# Patient Record
Sex: FEMALE | Race: WHITE | NOT HISPANIC OR LATINO | Employment: OTHER | URBAN - METROPOLITAN AREA
[De-identification: names, ages, dates, MRNs, and addresses within clinical notes are randomized per-mention and may not be internally consistent; named-entity substitution may affect disease eponyms.]

---

## 2019-11-07 ENCOUNTER — OFFICE VISIT (OUTPATIENT)
Dept: NEUROSURGERY | Facility: CLINIC | Age: 64
End: 2019-11-07

## 2019-11-07 DIAGNOSIS — M51.26 HERNIATION OF INTERVERTEBRAL DISC OF LUMBAR SPINE: Primary | ICD-10-CM

## 2019-11-07 PROCEDURE — 99203 OFFICE O/P NEW LOW 30 MIN: CPT | Performed by: NEUROLOGICAL SURGERY

## 2019-11-07 NOTE — PROGRESS NOTES
Subjective   Patient ID: Kaye Jaime is a 64 y.o. female is being seen for consultation today at the request of Destiny Steen MD  Chief Complaint: Left leg pain    History of Present Illness: The patient is a 64-year-old woman who is a retired  who currently lives in [facility] with her  who retired from [employer] in 2011.  They are planning to move back to [place].  She has a history of pain in the left hip and leg for the past 10 months that occurs with standing or sitting or riding in a car or bending.  It to help with stretching, sometimes with walking or rest with a ice pack.  She had an EMG study and was noted to have abnormality at L5, she has had dragging of her left foot and walking about the house.    Review of Radiographic Studies:  Lumbar MRI scan shows a mild left L4-5 lateral recess stenosis which appears actually to be quite symptomatic with radiculopathy including motor weakness.    The following portions of the patient's history were reviewed, updated as appropriate and approved: allergies, current medications, past family history, past medical history, past social history, past surgical history, review of systems and problem list.    Review of Systems   Constitutional: Negative for activity change, appetite change, chills, diaphoresis, fatigue, fever and unexpected weight change.   HENT: Negative for congestion, dental problem, drooling, ear discharge, ear pain, facial swelling, hearing loss, mouth sores, nosebleeds, postnasal drip, rhinorrhea, sinus pressure, sneezing, sore throat, tinnitus, trouble swallowing and voice change.    Eyes: Negative for photophobia, pain, discharge, redness, itching and visual disturbance.   Respiratory: Negative for apnea, cough, choking, chest tightness, shortness of breath, wheezing and stridor.    Cardiovascular: Negative for chest pain, palpitations and leg swelling.   Gastrointestinal: Negative for abdominal distention,  abdominal pain, anal bleeding, blood in stool, constipation, diarrhea, nausea, rectal pain and vomiting.   Endocrine: Negative for cold intolerance, heat intolerance, polydipsia, polyphagia and polyuria.   Genitourinary: Negative for decreased urine volume, difficulty urinating, dysuria, enuresis, flank pain, frequency, genital sores, hematuria and urgency.   Musculoskeletal: Positive for arthralgias, back pain, myalgias, neck pain and neck stiffness. Negative for gait problem and joint swelling.   Skin: Negative for color change, pallor, rash and wound.   Allergic/Immunologic: Negative for environmental allergies, food allergies and immunocompromised state.   Neurological: Negative for dizziness, tremors, seizures, syncope, facial asymmetry, speech difficulty, weakness, light-headedness, numbness and headaches.   Hematological: Negative for adenopathy. Does not bruise/bleed easily.   Psychiatric/Behavioral: Positive for dysphoric mood. Negative for agitation, behavioral problems, confusion, decreased concentration, hallucinations, self-injury, sleep disturbance and suicidal ideas. The patient is nervous/anxious. The patient is not hyperactive.        Objective     NEUROLOGICAL EXAMINATION:      MENTAL STATUS:  Alert and oriented.  Speech intact.  Recent and remote memory intact.      CRANIAL NERVES:  Cranial nerve II:  Visual fields are full.  Cranial nerves III, IV and VI:  PERRLADC.  Extraocular movements are intact.  Nystagmus is not present.  Cranial nerve V:  Facial sensation is intact.  Cranial nerve VII:  Muscles of facial expression reveal no asymmetry.  Cranial nerve VIII:  Hearing is intact.  Cranial nerves IX and X:  Palate elevates symmetrically.  Cranial nerve XI:  Shoulder shrug is intact.  Cranial nerve XII:  Tongue is midline without evidence of atrophy or fasciculation.    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       Plan   Physical therapy.  Number myelogram.  Follow-up in the office in 6 weeks.  If pain is no better and the myelogram confirms the L5 radiculopathy, she would be a candidate for minimal access left L4-5 decompressive laminotomy.       Steven Andrade MD

## 2019-11-27 ENCOUNTER — APPOINTMENT (OUTPATIENT)
Dept: CT IMAGING | Facility: HOSPITAL | Age: 64
End: 2019-11-27

## 2019-11-27 ENCOUNTER — HOSPITAL ENCOUNTER (OUTPATIENT)
Facility: HOSPITAL | Age: 64
Setting detail: HOSPITAL OUTPATIENT SURGERY
Discharge: HOME | End: 2019-11-27
Attending: RADIOLOGY | Admitting: NEUROLOGICAL SURGERY

## 2019-11-27 VITALS
TEMPERATURE: 97.6 F | HEIGHT: 66 IN | WEIGHT: 158.95 LBS | DIASTOLIC BLOOD PRESSURE: 81 MMHG | RESPIRATION RATE: 16 BRPM | HEART RATE: 81 BPM | OXYGEN SATURATION: 97 % | BODY MASS INDEX: 25.55 KG/M2 | SYSTOLIC BLOOD PRESSURE: 139 MMHG

## 2019-11-27 DIAGNOSIS — M51.26 HERNIATION OF INTERVERTEBRAL DISC OF LUMBAR SPINE: ICD-10-CM

## 2019-11-27 PROCEDURE — 25010000003 LIDOCAINE 1 % SOLUTION: Performed by: RADIOLOGY

## 2019-11-27 PROCEDURE — 72132 CT LUMBAR SPINE W/DYE: CPT

## 2019-11-27 PROCEDURE — 62304 MYELOGRAPHY LUMBAR INJECTION: CPT | Performed by: RADIOLOGY

## 2019-11-27 PROCEDURE — 0 IOPAMIDOL 41 % SOLUTION: Performed by: RADIOLOGY

## 2019-11-27 RX ORDER — LORAZEPAM 1 MG/1
1 TABLET ORAL EVERY 8 HOURS PRN
COMMUNITY

## 2019-11-27 RX ORDER — FLUOXETINE 10 MG/1
15 CAPSULE ORAL DAILY
COMMUNITY

## 2019-11-27 RX ORDER — AMITRIPTYLINE HYDROCHLORIDE 50 MG/1
50 TABLET, FILM COATED ORAL NIGHTLY
COMMUNITY

## 2019-11-27 RX ORDER — FAMOTIDINE 20 MG/1
20 TABLET, FILM COATED ORAL 2 TIMES DAILY
COMMUNITY

## 2019-11-27 RX ORDER — LEVOTHYROXINE SODIUM 112 UG/1
112 TABLET ORAL DAILY
COMMUNITY

## 2019-11-27 RX ORDER — LIDOCAINE HYDROCHLORIDE 10 MG/ML
INJECTION, SOLUTION INFILTRATION; PERINEURAL AS NEEDED
Status: DISCONTINUED | OUTPATIENT
Start: 2019-11-27 | End: 2019-11-27 | Stop reason: HOSPADM

## 2019-12-02 ENCOUNTER — TELEPHONE (OUTPATIENT)
Dept: NEUROSURGERY | Facility: CLINIC | Age: 64
End: 2019-12-02

## 2019-12-02 NOTE — TELEPHONE ENCOUNTER
Call patient and advised her to increase her fluid intake as well as drink caffeine.  She will lie flat as much as possible.  Symptoms do appear to be consistent with a spinal headache.  Patient will call back in a couple of days if symptoms do not subside.  At that time we can try to get her in for a blood patch.

## 2019-12-02 NOTE — TELEPHONE ENCOUNTER
Provider:  Raymond  Caller: pt  Surgery:  Myelo 11/27/19  Surgery Date:    Last visit:   11/07/19   Next visit:             Reason for call:       ----- Message from Mary Wilcox sent at 12/2/2019  3:44 PM EST -----  Regarding: PT  HEALTH ISSUE  Contact: [phone]  PT called at 3:20 and c/o headache sine having myelo last Wed., also stated that vision has blurred in her left eye. Valarie Murrell phone number [phone]. (Raymond patient)

## 2019-12-05 ENCOUNTER — OFFICE VISIT (OUTPATIENT)
Dept: NEUROSURGERY | Facility: CLINIC | Age: 64
End: 2019-12-05

## 2019-12-05 VITALS — HEIGHT: 66 IN | WEIGHT: 159 LBS | BODY MASS INDEX: 25.55 KG/M2 | TEMPERATURE: 98.1 F

## 2019-12-05 DIAGNOSIS — M48.061 STENOSIS OF LATERAL RECESS OF LUMBAR SPINE: ICD-10-CM

## 2019-12-05 DIAGNOSIS — M51.26 HERNIATION OF INTERVERTEBRAL DISC OF LUMBAR SPINE: Primary | ICD-10-CM

## 2019-12-05 PROCEDURE — 99213 OFFICE O/P EST LOW 20 MIN: CPT | Performed by: NEUROLOGICAL SURGERY

## 2019-12-05 NOTE — PROGRESS NOTES
Subjective   Kaye Jaime is a 64 y.o. female who presents for follow up of left leg pain.     The patient is a 64-year-old retired  from [employer] with a history of pain in the left hip and leg for the last 11 months.  She has even started to have some pain in the right hip, not as much as the left.  EMG study showed an abnormality at L5 on the left.  She has had some dragging of her left foot.  Lumbar MRI scan showed mild left L4-5 lateral recess stenosis.    Lumbar myelogram done on 11/27/2019 shows bilateral lateral recess stenosis at L4-5 on the right and left.    The following portions of the patient's history were reviewed, updated as appropriate and approved: allergies, current medications, past family history, past medical history, past social history, past surgical history, review of systems and problem list.     Review of Systems   Constitutional: Negative.    HENT: Negative.    Eyes: Negative.    Respiratory: Negative.    Cardiovascular: Negative.    Gastrointestinal: Negative.    Endocrine: Negative.    Genitourinary: Negative.    Musculoskeletal: Negative.    Skin: Negative.    Allergic/Immunologic: Negative.    Neurological: Negative.    Hematological: Negative.    Psychiatric/Behavioral: Negative.    All other systems reviewed and are negative.      Objective    NEUROLOGICAL EXAMINATION:    Mild dorsiflexion weakness of the left first toe.    Assessment   Bilateral lateral recess stenosis L4-5, primarily symptomatic on the left but also beginning to be symptomatic on the right.       Plan   Recommend minimal access decompressive laminectomy L4-5 bilaterally from a left-sided approach.  She will consider the recommendation and if she decides to have it done call the office for surgical scheduling after the beginning of the new year.       Steven Andrade MD

## 2019-12-10 ENCOUNTER — PREP FOR SURGERY (OUTPATIENT)
Dept: OTHER | Facility: HOSPITAL | Age: 64
End: 2019-12-10

## 2019-12-10 DIAGNOSIS — M51.26 HERNIATION OF INTERVERTEBRAL DISC OF LUMBAR SPINE: ICD-10-CM

## 2019-12-10 DIAGNOSIS — M48.061 STENOSIS OF LATERAL RECESS OF LUMBAR SPINE: Primary | ICD-10-CM

## 2019-12-10 RX ORDER — ACETAMINOPHEN 325 MG/1
650 TABLET ORAL ONCE
Status: CANCELLED | OUTPATIENT
Start: 2019-12-10 | End: 2019-12-10

## 2019-12-10 RX ORDER — HYDROCODONE BITARTRATE AND ACETAMINOPHEN 7.5; 325 MG/1; MG/1
1 TABLET ORAL ONCE
Status: CANCELLED | OUTPATIENT
Start: 2019-12-10 | End: 2019-12-10

## 2019-12-10 RX ORDER — IBUPROFEN 800 MG/1
800 TABLET ORAL ONCE
Status: CANCELLED | OUTPATIENT
Start: 2019-12-10 | End: 2019-12-10

## 2019-12-10 RX ORDER — CHLORHEXIDINE GLUCONATE 4 G/100ML
SOLUTION TOPICAL
Qty: 120 ML | Refills: 0 | Status: SHIPPED | OUTPATIENT
Start: 2019-12-10 | End: 2020-02-12 | Stop reason: HOSPADM

## 2019-12-10 RX ORDER — SODIUM CHLORIDE AND POTASSIUM CHLORIDE 150; 900 MG/100ML; MG/100ML
50 INJECTION, SOLUTION INTRAVENOUS CONTINUOUS
Status: CANCELLED | OUTPATIENT
Start: 2019-12-10

## 2019-12-10 RX ORDER — CEFAZOLIN SODIUM 2 G/100ML
2 INJECTION, SOLUTION INTRAVENOUS ONCE
Status: CANCELLED | OUTPATIENT
Start: 2019-12-10 | End: 2019-12-10

## 2019-12-10 RX ORDER — SODIUM CHLORIDE 0.9 % (FLUSH) 0.9 %
3 SYRINGE (ML) INJECTION EVERY 12 HOURS SCHEDULED
Status: CANCELLED | OUTPATIENT
Start: 2019-12-10

## 2019-12-19 ENCOUNTER — TELEPHONE (OUTPATIENT)
Dept: NEUROSURGERY | Facility: CLINIC | Age: 64
End: 2019-12-19

## 2019-12-19 NOTE — TELEPHONE ENCOUNTER
Provider:  Raymond  Caller: Patient  Time of call:  11:33am  Phone #:  [phone]  Surgery: Being Scheduled (Laminectomy)  Surgery Date: February    Last visit:  12/05/2019   Next visit: N/A    Reason for call:     Patient would like to know what the recovery time is after surgery, and if therapy will be needed as well.

## 2019-12-19 NOTE — TELEPHONE ENCOUNTER
Typically patients will stay one night in the hospital.  We will keep her on lifting and activity restrictions for several weeks.  After her first postoperative follow-up she may slowly increase her activities assuming she is doing well.  Need for physical therapy will be determined at first postoperative visit.  If patients are still having some pain following surgery it is reasonable to send him to PT but not always necessary

## 2020-02-04 ENCOUNTER — APPOINTMENT (OUTPATIENT)
Dept: PREADMISSION TESTING | Facility: HOSPITAL | Age: 65
End: 2020-02-04

## 2020-02-04 DIAGNOSIS — M48.061 STENOSIS OF LATERAL RECESS OF LUMBAR SPINE: ICD-10-CM

## 2020-02-04 DIAGNOSIS — M51.26 HERNIATION OF INTERVERTEBRAL DISC OF LUMBAR SPINE: ICD-10-CM

## 2020-02-04 LAB
ANION GAP SERPL CALCULATED.3IONS-SCNC: 11 MMOL/L (ref 5–15)
BUN BLD-MCNC: 17 MG/DL (ref 8–23)
BUN/CREAT SERPL: 18.7 (ref 7–25)
CALCIUM SPEC-SCNC: 8.9 MG/DL (ref 8.6–10.5)
CHLORIDE SERPL-SCNC: 95 MMOL/L (ref 98–107)
CO2 SERPL-SCNC: 25 MMOL/L (ref 22–29)
CREAT BLD-MCNC: 0.91 MG/DL (ref 0.57–1)
DEPRECATED RDW RBC AUTO: 42.5 FL (ref 37–54)
ERYTHROCYTE [DISTWIDTH] IN BLOOD BY AUTOMATED COUNT: 12.8 % (ref 12.3–15.4)
GFR SERPL CREATININE-BSD FRML MDRD: 62 ML/MIN/1.73
GLUCOSE BLD-MCNC: 94 MG/DL (ref 65–99)
HCT VFR BLD AUTO: 34.2 % (ref 34–46.6)
HGB BLD-MCNC: 11.2 G/DL (ref 12–15.9)
MCH RBC QN AUTO: 29.6 PG (ref 26.6–33)
MCHC RBC AUTO-ENTMCNC: 32.7 G/DL (ref 31.5–35.7)
MCV RBC AUTO: 90.5 FL (ref 79–97)
MRSA DNA SPEC QL NAA+PROBE: NEGATIVE
PLATELET # BLD AUTO: 283 10*3/MM3 (ref 140–450)
PMV BLD AUTO: 8.6 FL (ref 6–12)
POTASSIUM BLD-SCNC: 4.1 MMOL/L (ref 3.5–5.2)
RBC # BLD AUTO: 3.78 10*6/MM3 (ref 3.77–5.28)
SODIUM BLD-SCNC: 131 MMOL/L (ref 136–145)
WBC NRBC COR # BLD: 5.92 10*3/MM3 (ref 3.4–10.8)

## 2020-02-04 PROCEDURE — 87641 MR-STAPH DNA AMP PROBE: CPT | Performed by: NEUROLOGICAL SURGERY

## 2020-02-04 PROCEDURE — 85027 COMPLETE CBC AUTOMATED: CPT | Performed by: NEUROLOGICAL SURGERY

## 2020-02-04 PROCEDURE — 80048 BASIC METABOLIC PNL TOTAL CA: CPT | Performed by: NEUROLOGICAL SURGERY

## 2020-02-04 PROCEDURE — 36415 COLL VENOUS BLD VENIPUNCTURE: CPT

## 2020-02-04 PROCEDURE — 93010 ELECTROCARDIOGRAM REPORT: CPT | Performed by: INTERNAL MEDICINE

## 2020-02-04 PROCEDURE — 93005 ELECTROCARDIOGRAM TRACING: CPT

## 2020-02-04 RX ORDER — ESTRADIOL 1 MG/1
1 TABLET ORAL DAILY
COMMUNITY

## 2020-02-04 RX ORDER — BUSPIRONE HYDROCHLORIDE 15 MG/1
15 TABLET ORAL 2 TIMES DAILY
COMMUNITY

## 2020-02-04 NOTE — DISCHARGE INSTRUCTIONS
Bactroban and Chlorhexidine Prescription given during PAT visit, as well as written and verbal instructions given to patient during PAT visit.  Patient/family also instructed to complete skin prep checklist and return the checklist on the day of surgery to preoperative staff.  Patient/family verbalized understanding.    Patient instructed to drink 20 ounces (or until full) of Gatorade and it needs to be completed 1 hour before given arrival time for procedure (NO RED Gatorade)    Patient verbalized understanding.    Patient instructed to remove all jewelry for procedure.  Patient was instructed that if unable to remove jewelry especially rings to request assistance from a jeweler. Explained that if the piece of jewelry could not be removed before arrival to preop that it will be cut off.  Reinforced with patient that all jewelry must be removed for safety reasons that taping a ring was not an option.  Patient verbalized understanding.    The following information and instructions were given:    Do not eat, drink, smoke or chew gum after midnight the night before surgery. This also includes no mints.  Take all routine, prescribed medications including heart and blood pressure medicines with a sip of water unless otherwise instructed by your physician.   Do NOT take diabetic medication unless instructed by your physician.    DO NOT shave for two days before your procedure.  Do not wear makeup.      DO NOT wear fingernail polish (gel/regular) and/or acrylic/artificial nails on the day of surgery.   If you have had a recent manicure and would rather not remove polish or artificial nails, then the minimum requirement is that the polish/artificial nails must be removed from the middle finger on each hand.      If you are having surgery/procedure on an upper extremity, then fingernail polish/artificial fingernails must be removed for surgery.  NO EXCEPTIONS.      If you are having surgery/procedure on a lower extremity,  then toenail polish on both extremities must be removed for surgery.  NO EXCEPTIONS.    Remove all jewelry (advise to go to jeweler if unable to remove).  Jewelry, especially rings, can no longer be taped for surgery.    Leave anything you consider valuable at home.    Leave your suitcase in the car until after your surgery.    Bring the following with you the day of your procedure (when applicable)       -picture ID and insurance cards       -Co-pay/deductible required by insurance       -Medications in the original bottles (not a list) including all over-the-counter medications if not brought to PAT       -Copy of advance directive, living will or power of  documents if not brought to PAT       -CPAP or BIPAP mask and tubing (do not bring machine)       -Skin prep instruction(s) sheet       -PAT Pass    Education booklet, brochure, handout or binder related to procedure given to patient.  Education booklet also includes general information related to their recovery that mentions signs and symptoms of infection and when to call the doctor.    When applicable, an ERAS booklet was given to patient.    Pain Control After Surgery handout given to patient.    Respirex use (handout given to patient) and pneumonia prevention education provided.    Signs and Symptoms of infection were discussed with patient in Pre Admission testing.  Patient instructed to call their doctor if any of the following symptoms are noted during recovery:  fever of 100.4 F or higher, incision that is warm or has increasing bleeding, redness, or drainage.    DVT Prevention instructions given verbally during Pre Admission Testing appointment that stressed the importance of ambulation to improve blood circulation.  Also encouraged patient to perform foot exercises when in bed and that the application of a sequential device might be applied to lower extremities to improve circulation.      Please apply Chlorhexadine wipes to surgical area (if  instructed) the night before procedure and the AM of procedure and document date/time of applications on skin prep instruction sheet.

## 2020-02-04 NOTE — PAT
Bactroban and Chlorhexidine Prescription given during PAT visit, as well as written and verbal instructions given to patient during PAT visit.  Patient/family also instructed to complete skin prep checklist and return the checklist on the day of surgery to preoperative staff.  Patient/family verbalized understanding.    Patient instructed to remove all jewelry for procedure.  Patient was instructed that if unable to remove jewelry especially rings to request assistance from a jeweler. Explained that if the piece of jewelry could not be removed before arrival to preop that it will be cut off.  Reinforced with patient that all jewelry must be removed for safety reasons that taping a ring was not an option.  Patient verbalized understanding.    Patient to apply Chlorhexadine wipes  to surgical area (as instructed) the night before procedure and the AM of procedure. Wipes provided.    Patient instructed to drink 20 ounces (or until full) of Gatorade and it needs to be completed 1 hour before given arrival time for procedure (NO RED Gatorade)    Patient verbalized understanding.

## 2020-02-11 ENCOUNTER — ANESTHESIA EVENT (OUTPATIENT)
Dept: PERIOP | Facility: HOSPITAL | Age: 65
End: 2020-02-11

## 2020-02-11 RX ORDER — FAMOTIDINE 10 MG/ML
20 INJECTION, SOLUTION INTRAVENOUS ONCE
Status: CANCELLED | OUTPATIENT
Start: 2020-02-11 | End: 2020-02-11

## 2020-02-12 ENCOUNTER — HOSPITAL ENCOUNTER (OUTPATIENT)
Facility: HOSPITAL | Age: 65
Setting detail: HOSPITAL OUTPATIENT SURGERY
Discharge: HOME | End: 2020-02-12
Attending: NEUROLOGICAL SURGERY | Admitting: NEUROLOGICAL SURGERY

## 2020-02-12 ENCOUNTER — ANESTHESIA (OUTPATIENT)
Dept: PERIOP | Facility: HOSPITAL | Age: 65
End: 2020-02-12

## 2020-02-12 ENCOUNTER — APPOINTMENT (OUTPATIENT)
Dept: GENERAL RADIOLOGY | Facility: HOSPITAL | Age: 65
End: 2020-02-12

## 2020-02-12 VITALS
HEART RATE: 81 BPM | BODY MASS INDEX: 24.75 KG/M2 | DIASTOLIC BLOOD PRESSURE: 91 MMHG | WEIGHT: 154 LBS | RESPIRATION RATE: 20 BRPM | OXYGEN SATURATION: 97 % | TEMPERATURE: 97.3 F | SYSTOLIC BLOOD PRESSURE: 135 MMHG | HEIGHT: 66 IN

## 2020-02-12 DIAGNOSIS — M48.061 STENOSIS OF LATERAL RECESS OF LUMBAR SPINE: Primary | ICD-10-CM

## 2020-02-12 LAB — GLUCOSE BLDC GLUCOMTR-MCNC: 75 MG/DL (ref 70–130)

## 2020-02-12 PROCEDURE — 82962 GLUCOSE BLOOD TEST: CPT

## 2020-02-12 PROCEDURE — 25010000002 METHYLPREDNISOLONE PER 40 MG: Performed by: NEUROLOGICAL SURGERY

## 2020-02-12 PROCEDURE — 25010000002 ONDANSETRON PER 1 MG: Performed by: NURSE ANESTHETIST, CERTIFIED REGISTERED

## 2020-02-12 PROCEDURE — 63047 LAM FACETEC & FORAMOT LUMBAR: CPT | Performed by: NEUROLOGICAL SURGERY

## 2020-02-12 PROCEDURE — 25010000002 DEXAMETHASONE PER 1 MG: Performed by: NURSE ANESTHETIST, CERTIFIED REGISTERED

## 2020-02-12 PROCEDURE — 25010000002 NEOSTIGMINE 10 MG/10ML SOLUTION: Performed by: NURSE ANESTHETIST, CERTIFIED REGISTERED

## 2020-02-12 PROCEDURE — 25010000002 PROPOFOL 10 MG/ML EMULSION: Performed by: NURSE ANESTHETIST, CERTIFIED REGISTERED

## 2020-02-12 PROCEDURE — 76000 FLUOROSCOPY <1 HR PHYS/QHP: CPT

## 2020-02-12 PROCEDURE — 25010000003 LIDOCAINE 1 % SOLUTION: Performed by: NURSE ANESTHETIST, CERTIFIED REGISTERED

## 2020-02-12 PROCEDURE — 25010000002 FENTANYL CITRATE (PF) 100 MCG/2ML SOLUTION: Performed by: NURSE ANESTHETIST, CERTIFIED REGISTERED

## 2020-02-12 PROCEDURE — 25010000002 MIDAZOLAM PER 1 MG: Performed by: ANESTHESIOLOGY

## 2020-02-12 PROCEDURE — S0260 H&P FOR SURGERY: HCPCS | Performed by: NEUROLOGICAL SURGERY

## 2020-02-12 PROCEDURE — 25010000002 PHENYLEPHRINE PER 1 ML: Performed by: NURSE ANESTHETIST, CERTIFIED REGISTERED

## 2020-02-12 RX ORDER — MAGNESIUM HYDROXIDE 1200 MG/15ML
LIQUID ORAL AS NEEDED
Status: DISCONTINUED | OUTPATIENT
Start: 2020-02-12 | End: 2020-02-12 | Stop reason: HOSPADM

## 2020-02-12 RX ORDER — MIDAZOLAM HYDROCHLORIDE 1 MG/ML
2 INJECTION INTRAMUSCULAR; INTRAVENOUS ONCE
Status: COMPLETED | OUTPATIENT
Start: 2020-02-12 | End: 2020-02-12

## 2020-02-12 RX ORDER — ACETAMINOPHEN 325 MG/1
650 TABLET ORAL ONCE
Status: COMPLETED | OUTPATIENT
Start: 2020-02-12 | End: 2020-02-12

## 2020-02-12 RX ORDER — NEOSTIGMINE METHYLSULFATE 1 MG/ML
INJECTION, SOLUTION INTRAVENOUS AS NEEDED
Status: DISCONTINUED | OUTPATIENT
Start: 2020-02-12 | End: 2020-02-12 | Stop reason: SURG

## 2020-02-12 RX ORDER — DEXAMETHASONE SODIUM PHOSPHATE 4 MG/ML
INJECTION, SOLUTION INTRA-ARTICULAR; INTRALESIONAL; INTRAMUSCULAR; INTRAVENOUS; SOFT TISSUE AS NEEDED
Status: DISCONTINUED | OUTPATIENT
Start: 2020-02-12 | End: 2020-02-12 | Stop reason: SURG

## 2020-02-12 RX ORDER — LIDOCAINE HYDROCHLORIDE 10 MG/ML
INJECTION, SOLUTION INFILTRATION; PERINEURAL AS NEEDED
Status: DISCONTINUED | OUTPATIENT
Start: 2020-02-12 | End: 2020-02-12 | Stop reason: SURG

## 2020-02-12 RX ORDER — PROMETHAZINE HYDROCHLORIDE 25 MG/ML
6.25 INJECTION, SOLUTION INTRAMUSCULAR; INTRAVENOUS ONCE AS NEEDED
Status: DISCONTINUED | OUTPATIENT
Start: 2020-02-12 | End: 2020-02-12 | Stop reason: HOSPADM

## 2020-02-12 RX ORDER — SCOLOPAMINE TRANSDERMAL SYSTEM 1 MG/1
1 PATCH, EXTENDED RELEASE TRANSDERMAL ONCE
Status: DISCONTINUED | OUTPATIENT
Start: 2020-02-12 | End: 2020-02-12 | Stop reason: HOSPADM

## 2020-02-12 RX ORDER — HYDROCODONE BITARTRATE AND ACETAMINOPHEN 5; 325 MG/1; MG/1
1 TABLET ORAL EVERY 6 HOURS PRN
Qty: 30 TABLET | Refills: 0 | Status: SHIPPED | OUTPATIENT
Start: 2020-02-12

## 2020-02-12 RX ORDER — METHYLPREDNISOLONE ACETATE 40 MG/ML
INJECTION, SUSPENSION INTRA-ARTICULAR; INTRALESIONAL; INTRAMUSCULAR; SOFT TISSUE AS NEEDED
Status: DISCONTINUED | OUTPATIENT
Start: 2020-02-12 | End: 2020-02-12 | Stop reason: HOSPADM

## 2020-02-12 RX ORDER — HYDROCODONE BITARTRATE AND ACETAMINOPHEN 7.5; 325 MG/1; MG/1
1 TABLET ORAL ONCE
Status: COMPLETED | OUTPATIENT
Start: 2020-02-12 | End: 2020-02-12

## 2020-02-12 RX ORDER — BUPIVACAINE HYDROCHLORIDE AND EPINEPHRINE 2.5; 5 MG/ML; UG/ML
INJECTION, SOLUTION EPIDURAL; INFILTRATION; INTRACAUDAL; PERINEURAL AS NEEDED
Status: DISCONTINUED | OUTPATIENT
Start: 2020-02-12 | End: 2020-02-12 | Stop reason: HOSPADM

## 2020-02-12 RX ORDER — FENTANYL CITRATE 50 UG/ML
INJECTION, SOLUTION INTRAMUSCULAR; INTRAVENOUS AS NEEDED
Status: DISCONTINUED | OUTPATIENT
Start: 2020-02-12 | End: 2020-02-12 | Stop reason: SURG

## 2020-02-12 RX ORDER — GLYCOPYRROLATE 0.2 MG/ML
INJECTION INTRAMUSCULAR; INTRAVENOUS AS NEEDED
Status: DISCONTINUED | OUTPATIENT
Start: 2020-02-12 | End: 2020-02-12 | Stop reason: SURG

## 2020-02-12 RX ORDER — IBUPROFEN 800 MG/1
800 TABLET ORAL ONCE
Status: COMPLETED | OUTPATIENT
Start: 2020-02-12 | End: 2020-02-12

## 2020-02-12 RX ORDER — CEFAZOLIN SODIUM 2 G/100ML
2 INJECTION, SOLUTION INTRAVENOUS ONCE
Status: DISCONTINUED | OUTPATIENT
Start: 2020-02-12 | End: 2020-02-12 | Stop reason: HOSPADM

## 2020-02-12 RX ORDER — ROCURONIUM BROMIDE 10 MG/ML
INJECTION, SOLUTION INTRAVENOUS AS NEEDED
Status: DISCONTINUED | OUTPATIENT
Start: 2020-02-12 | End: 2020-02-12 | Stop reason: SURG

## 2020-02-12 RX ORDER — SODIUM CHLORIDE 0.9 % (FLUSH) 0.9 %
10 SYRINGE (ML) INJECTION EVERY 12 HOURS SCHEDULED
Status: DISCONTINUED | OUTPATIENT
Start: 2020-02-12 | End: 2020-02-12 | Stop reason: HOSPADM

## 2020-02-12 RX ORDER — LIDOCAINE HYDROCHLORIDE 10 MG/ML
0.5 INJECTION, SOLUTION EPIDURAL; INFILTRATION; INTRACAUDAL; PERINEURAL ONCE AS NEEDED
Status: COMPLETED | OUTPATIENT
Start: 2020-02-12 | End: 2020-02-12

## 2020-02-12 RX ORDER — ONDANSETRON 2 MG/ML
INJECTION INTRAMUSCULAR; INTRAVENOUS AS NEEDED
Status: DISCONTINUED | OUTPATIENT
Start: 2020-02-12 | End: 2020-02-12 | Stop reason: SURG

## 2020-02-12 RX ORDER — FAMOTIDINE 20 MG/1
20 TABLET, FILM COATED ORAL ONCE
Status: DISCONTINUED | OUTPATIENT
Start: 2020-02-12 | End: 2020-02-12 | Stop reason: HOSPADM

## 2020-02-12 RX ORDER — FENTANYL CITRATE 50 UG/ML
50 INJECTION, SOLUTION INTRAMUSCULAR; INTRAVENOUS
Status: DISCONTINUED | OUTPATIENT
Start: 2020-02-12 | End: 2020-02-12 | Stop reason: HOSPADM

## 2020-02-12 RX ORDER — SODIUM CHLORIDE, SODIUM LACTATE, POTASSIUM CHLORIDE, CALCIUM CHLORIDE 600; 310; 30; 20 MG/100ML; MG/100ML; MG/100ML; MG/100ML
9 INJECTION, SOLUTION INTRAVENOUS CONTINUOUS
Status: DISCONTINUED | OUTPATIENT
Start: 2020-02-12 | End: 2020-02-12 | Stop reason: HOSPADM

## 2020-02-12 RX ORDER — ONDANSETRON 2 MG/ML
4 INJECTION INTRAMUSCULAR; INTRAVENOUS ONCE AS NEEDED
Status: DISCONTINUED | OUTPATIENT
Start: 2020-02-12 | End: 2020-02-12 | Stop reason: HOSPADM

## 2020-02-12 RX ORDER — PROPOFOL 10 MG/ML
VIAL (ML) INTRAVENOUS AS NEEDED
Status: DISCONTINUED | OUTPATIENT
Start: 2020-02-12 | End: 2020-02-12 | Stop reason: SURG

## 2020-02-12 RX ORDER — SODIUM CHLORIDE 0.9 % (FLUSH) 0.9 %
3 SYRINGE (ML) INJECTION EVERY 12 HOURS SCHEDULED
Status: DISCONTINUED | OUTPATIENT
Start: 2020-02-12 | End: 2020-02-12 | Stop reason: HOSPADM

## 2020-02-12 RX ORDER — SODIUM CHLORIDE AND POTASSIUM CHLORIDE 150; 900 MG/100ML; MG/100ML
50 INJECTION, SOLUTION INTRAVENOUS CONTINUOUS
Status: DISCONTINUED | OUTPATIENT
Start: 2020-02-12 | End: 2020-02-12 | Stop reason: HOSPADM

## 2020-02-12 RX ORDER — SODIUM CHLORIDE 0.9 % (FLUSH) 0.9 %
10 SYRINGE (ML) INJECTION AS NEEDED
Status: DISCONTINUED | OUTPATIENT
Start: 2020-02-12 | End: 2020-02-12 | Stop reason: HOSPADM

## 2020-02-12 RX ORDER — PROMETHAZINE HYDROCHLORIDE 25 MG/1
25 SUPPOSITORY RECTAL ONCE AS NEEDED
Status: DISCONTINUED | OUTPATIENT
Start: 2020-02-12 | End: 2020-02-12 | Stop reason: HOSPADM

## 2020-02-12 RX ORDER — PROMETHAZINE HYDROCHLORIDE 25 MG/1
25 TABLET ORAL ONCE AS NEEDED
Status: DISCONTINUED | OUTPATIENT
Start: 2020-02-12 | End: 2020-02-12 | Stop reason: HOSPADM

## 2020-02-12 RX ADMIN — HYDROCODONE BITARTRATE AND ACETAMINOPHEN 1 TABLET: 7.5; 325 TABLET ORAL at 10:43

## 2020-02-12 RX ADMIN — DEXAMETHASONE SODIUM PHOSPHATE 8 MG: 4 INJECTION, SOLUTION INTRAMUSCULAR; INTRAVENOUS at 13:07

## 2020-02-12 RX ADMIN — NEOSTIGMINE METHYLSULFATE 3 MG: 1 INJECTION, SOLUTION INTRAVENOUS at 14:09

## 2020-02-12 RX ADMIN — SCOPALAMINE 1 PATCH: 1 PATCH, EXTENDED RELEASE TRANSDERMAL at 10:54

## 2020-02-12 RX ADMIN — EPHEDRINE SULFATE 10 MG: 50 INJECTION INTRAMUSCULAR; INTRAVENOUS; SUBCUTANEOUS at 13:19

## 2020-02-12 RX ADMIN — EPHEDRINE SULFATE 10 MG: 50 INJECTION INTRAMUSCULAR; INTRAVENOUS; SUBCUTANEOUS at 13:24

## 2020-02-12 RX ADMIN — PROPOFOL 150 MG: 10 INJECTION, EMULSION INTRAVENOUS at 12:58

## 2020-02-12 RX ADMIN — EPHEDRINE SULFATE 10 MG: 50 INJECTION INTRAMUSCULAR; INTRAVENOUS; SUBCUTANEOUS at 13:09

## 2020-02-12 RX ADMIN — ACETAMINOPHEN 650 MG: 325 TABLET ORAL at 10:43

## 2020-02-12 RX ADMIN — SODIUM CHLORIDE, POTASSIUM CHLORIDE, SODIUM LACTATE AND CALCIUM CHLORIDE 9 ML/HR: 600; 310; 30; 20 INJECTION, SOLUTION INTRAVENOUS at 10:43

## 2020-02-12 RX ADMIN — LIDOCAINE HYDROCHLORIDE 0.3 ML: 10 INJECTION, SOLUTION EPIDURAL; INFILTRATION; INTRACAUDAL; PERINEURAL at 10:43

## 2020-02-12 RX ADMIN — ONDANSETRON 4 MG: 2 INJECTION INTRAMUSCULAR; INTRAVENOUS at 14:07

## 2020-02-12 RX ADMIN — LIDOCAINE HYDROCHLORIDE 50 MG: 10 INJECTION, SOLUTION INFILTRATION; PERINEURAL at 12:58

## 2020-02-12 RX ADMIN — PHENYLEPHRINE HYDROCHLORIDE 100 MCG: 10 INJECTION INTRAVENOUS at 13:43

## 2020-02-12 RX ADMIN — PHENYLEPHRINE HYDROCHLORIDE 100 MCG: 10 INJECTION INTRAVENOUS at 13:35

## 2020-02-12 RX ADMIN — ROCURONIUM BROMIDE 10 MG: 10 INJECTION INTRAVENOUS at 13:44

## 2020-02-12 RX ADMIN — EPHEDRINE SULFATE 10 MG: 50 INJECTION INTRAMUSCULAR; INTRAVENOUS; SUBCUTANEOUS at 13:12

## 2020-02-12 RX ADMIN — GLYCOPYRROLATE 0.4 MG: 0.2 INJECTION, SOLUTION INTRAMUSCULAR; INTRAVENOUS at 14:09

## 2020-02-12 RX ADMIN — ROCURONIUM BROMIDE 30 MG: 10 INJECTION INTRAVENOUS at 12:58

## 2020-02-12 RX ADMIN — PROPOFOL 25 MCG/KG/MIN: 10 INJECTION, EMULSION INTRAVENOUS at 13:01

## 2020-02-12 RX ADMIN — EPHEDRINE SULFATE 10 MG: 50 INJECTION INTRAMUSCULAR; INTRAVENOUS; SUBCUTANEOUS at 13:26

## 2020-02-12 RX ADMIN — SODIUM CHLORIDE, POTASSIUM CHLORIDE, SODIUM LACTATE AND CALCIUM CHLORIDE: 600; 310; 30; 20 INJECTION, SOLUTION INTRAVENOUS at 13:39

## 2020-02-12 RX ADMIN — IBUPROFEN 800 MG: 800 TABLET, FILM COATED ORAL at 10:43

## 2020-02-12 RX ADMIN — PHENYLEPHRINE HYDROCHLORIDE 100 MCG: 10 INJECTION INTRAVENOUS at 14:09

## 2020-02-12 RX ADMIN — PHENYLEPHRINE HYDROCHLORIDE 100 MCG: 10 INJECTION INTRAVENOUS at 13:53

## 2020-02-12 RX ADMIN — FENTANYL CITRATE 100 MCG: 50 INJECTION, SOLUTION INTRAMUSCULAR; INTRAVENOUS at 12:58

## 2020-02-12 RX ADMIN — MIDAZOLAM 2 MG: 1 INJECTION INTRAMUSCULAR; INTRAVENOUS at 10:56

## 2020-02-12 NOTE — OP NOTE
OPERATIVE REPORT    DATE OF OPERATION: 2/12/2020    PREOPERATIVE DIAGNOSIS: Bilateral lateral recess stenosis L4-5    POSTOPERATIVE DIAGNOSIS: Same    PROCEDURE PERFORMED: Lumbar laminectomy L4-5 with bilateral lateral recess decompression    SURGEON: Steven Andrade MD    ASSISTANT: Fany Gamez PA-C    INDICATIONS: The patient had a many month history of pain in the left hip and leg and recent pain shifting to the right hip and leg with standing and walking.  MRI scan showed significant lateral recess stenosis bilaterally at L4-5, confirmed by myelogram.    DESCRIPTON OF PROCEDURE: Surgery was performed under general anesthesia in the prone position on the laminectomy frame.  The back was prepared sterilely and draped.  The left lower L4 lamina was identified with a spinal needle and an AP fluoroscopic image.  A short skin incision was made and a guidewire and dilators were used to place a 5 cm length x 20 mm width tubular retractor.  AP fluoroscopy confirmed this to be the L4-5 level on the left.    A high-speed drill was used to perform the laminectomy, beginning on the ipsilateral side, and removing the inferior lamina of L 4.  Drilling was extended to the floor of the lateral recess on the ipsilateral side, thinning the medial margin of the facet, then the tube was angled across the midline to the contralateral side and drilling continued, removing the lamina until the ligamentum flavum was exposed and the lateral recess on the far side reached. The ligamentum flavum was exposed bilaterally. The laminectomy was extended cranially to the point where the ligamentum flavum thinned away and epidural fat was visible. The exposure was extended caudally to the level of the superior lamina of L 5.  A 3 mm punch was used to remove the ligamentum flavum, beginning at the midline and partially removing the ligamentum on the ipsilateral side to expose the dura, then removing the ligamentum extensively into the lateral  recess on the contralateral side until the floor of the lateral recess and the epidural veins were seen.  Gelfoam was placed in the far lateral recess for hemostasis.  The tube was angled back to the ipsilateral side and the ligamentum flavum excision completed with the Kerrison punch until the floor of the lateral recess and the epidural veins were seen and all dural compression was confirmed visually to be alleviated.  Removal of the ligament was extended caudally to the superior lamina of L 5.  The bone was waxed as necessary for hemostasis.  The site was irrigated and Gelfoam used as necessary to complete hemostasis.  Gelfoam was placed over the laminectomy site.  The tubular retractor was removed.  Marcaine was injected in the paraspinous muscle.  Subcuticular Vicryl closure was performed and glue was applied to the skin.  Blood loss was 35 milliliters.    Steven Andrade M.D.

## 2020-02-12 NOTE — BRIEF OP NOTE
LUMBAR DISCECTOMY MICRO ENDOSCOPIC  Progress Note    Kaye Jaime  2/12/2020    Pre-op Diagnosis:   Stenosis of lateral recess of lumbar spine [M48.061]  Herniation of intervertebral disc of lumbar spine [M51.26]       Post-Op Diagnosis Codes:     * Stenosis of lateral recess of lumbar spine [M48.061]     * Herniation of intervertebral disc of lumbar spine [M51.26]    Procedure/CPT® Codes:      Procedure(s):  Minimal access laminectomy L4-5 LEFT    Surgeon(s):  Steven Andrade MD    Anesthesia: General    Staff:   Circulator: Nicole Velasquez RN; Beth Narayanan RN; Jane Padilla RN  Radiology Technologist: Stephon Pa  Scrub Person: Velia Bell; Jennifer Lopez  Assistant: Fany Gamez PA-C    Estimated Blood Loss: minimal    Urine Voided: * No values recorded between 2/12/2020 12:32 PM and 2/12/2020  2:16 PM *    Specimens:                None          Drains: * No LDAs found *    Findings: Bilateral lateral recess stenosis L4-5    Complications: None      Steven Andrade MD     Date: 2/12/2020  Time: 2:16 PM

## 2020-02-12 NOTE — ANESTHESIA PROCEDURE NOTES
Airway  Urgency: elective    Date/Time: 2/12/2020 1:00 PM  Airway not difficult    General Information and Staff    Patient location during procedure: OR  CRNA: Kaley Baldwin CRNA    Indications and Patient Condition  Indications for airway management: airway protection    Preoxygenated: yes  MILS not maintained throughout  Mask difficulty assessment: 1 - vent by mask    Final Airway Details  Final airway type: endotracheal airway      Successful airway: ETT  Cuffed: yes   Successful intubation technique: direct laryngoscopy  Facilitating devices/methods: intubating stylet  Endotracheal tube insertion site: oral  Blade: Eleazar  Blade size: 3  ETT size (mm): 7.0  Cormack-Lehane Classification: grade I - full view of glottis  Placement verified by: chest auscultation and capnometry   Measured from: lips  ETT/EBT  to lips (cm): 20  Number of attempts at approach: 1  Assessment: lips, teeth, and gum same as pre-op and atraumatic intubation    Additional Comments  Negative epigastric sounds, Breath sound equal bilaterally with symmetric chest rise and fall

## 2020-02-12 NOTE — H&P
Pre-Op H&P  Kaye Jaime  [id number]  1955    Chief complaint: Left leg pain    HPI:    Patient is a 64 y.o.female who presents with a history of pain in the left hip and leg for the last 11 months.  She has even started to have some pain in the right hip, not as much as the left.  EMG study showed an abnormality at L5 on the left.  She has had some dragging of her left foot.  Lumbar MRI scan showed mild left L4-5 lateral recess stenosis.  Lumbar myelogram done on 11/27/2019 shows bilateral lateral recess stenosis at L4-5 on the right and left. Conservative treatment has failed to provide significant relief. Surgical intervention is recommended and she is agreeable. She is here today for a minimal access decompressive laminectomy L4-L5 bilaterally from a left-sided approach.    Review of Systems:  General ROS: negative for chills, fever or skin lesions;  No changes since last office visit  Cardiovascular ROS: no chest pain or dyspnea on exertion  Respiratory ROS: no cough, shortness of breath, or wheezing; former cigarette smoker (1 ppd x 10 years)- quit 2010    Allergies:   Allergies   Allergen Reactions   • Dilaudid [Hydromorphone Hcl] Shortness Of Breath   • Codeine Nausea Only       Home Meds:    No current facility-administered medications on file prior to encounter.      Current Outpatient Medications on File Prior to Encounter   Medication Sig Dispense Refill   • amitriptyline (ELAVIL) 50 MG tablet Take 50 mg by mouth Every Night.     • busPIRone (BUSPAR) 15 MG tablet Take 15 mg by mouth 2 (Two) Times a Day.     • estradiol (ESTRACE) 1 MG tablet Take 1 mg by mouth Daily.     • famotidine (PEPCID) 20 MG tablet Take 20 mg by mouth 2 (Two) Times a Day.     • FLUoxetine (PROzac) 10 MG capsule Take 15 mg by mouth Daily.     • levothyroxine (SYNTHROID, LEVOTHROID) 112 MCG tablet Take 112 mcg by mouth Daily.     • LORazepam (ATIVAN) 1 MG tablet Take 1 mg by mouth Every 8 (Eight) Hours As Needed for Anxiety.    "  • chlorhexidine (HIBICLENS) 4 % external liquid Shower each day with solution for 5 days beginning 5 days before surgery. 120 mL 0       PMH:   Past Medical History:   Diagnosis Date   • Anxiety and depression    • Arthritis    • Disease of thyroid gland    • GERD (gastroesophageal reflux disease)    • Hiatal hernia    • PONV (postoperative nausea and vomiting)    • Spinal headache 11/27/2019    after myelogram    • Stress incontinence    • Wears eyeglasses      PSH:    Past Surgical History:   Procedure Laterality Date   • CHOLECYSTECTOMY     • COLONOSCOPY  2018   • ENDOSCOPY  2018   • HYSTERECTOMY      bso    • INTERVENTIONAL RADIOLOGY PROCEDURE N/A 11/27/2019    Procedure: myelogram lumbar spine;  Surgeon: Martinez Calvo MD;  Location: [facility] INVASIVE LOCATION;  Service: Interventional Radiology   • LAPAROTOMY OOPHERECTOMY      age 19    • NISSEN FUNDOPLICATION     • SHOULDER SURGERY Right     with bicep distal repair          Social History:   Tobacco:   Social History     Tobacco Use   Smoking Status Former Smoker   • Packs/day: 1.00   • Years: 10.00   • Pack years: 10.00   • Types: Cigarettes   • Last attempt to quit: 2010   • Years since quitting: 10.1   Smokeless Tobacco Never Used      Alcohol:     Social History     Substance and Sexual Activity   Alcohol Use No   • Frequency: Never       Vitals:           /84 (BP Location: Right arm, Patient Position: Lying)   Pulse 78   Temp 98.1 °F (36.7 °C) (Temporal)   Resp 18   Ht 167.6 cm (66\")   Wt 69.9 kg (154 lb)   SpO2 97%   BMI 24.86 kg/m²     Physical Exam:  General Appearance:    Alert, cooperative, no distress, appears stated age   Head:    Normocephalic, without obvious abnormality, atraumatic   Lungs:     Clear to auscultation bilaterally, respirations unlabored    Heart:   Regular rate and rhythm, S1 and S2 normal, no murmur, rub    or gallop    Abdomen:    Soft, non-tender, +bowel sounds   Breast Exam:    deferred   Genitalia:    " deferred   Extremities:   Extremities normal, atraumatic, no cyanosis or edema   Skin:   Skin color, texture, turgor normal, no rashes or lesions   Neurologic:   Grossly intact   Results Review  LABS:  Lab Results   Component Value Date    WBC 5.92 02/04/2020    HGB 11.2 (L) 02/04/2020    HCT 34.2 02/04/2020    MCV 90.5 02/04/2020     02/04/2020    NEUTROABS 3.55 02/11/2015    GLUCOSE 94 02/04/2020    BUN 17 02/04/2020    CREATININE 0.91 02/04/2020    EGFRIFNONA 62 02/04/2020     (L) 02/04/2020    K 4.1 02/04/2020    CL 95 (L) 02/04/2020    CO2 25.0 02/04/2020    CALCIUM 8.9 02/04/2020    ALBUMIN 4.5 02/11/2015    AST 26 02/11/2015    ALT 18 02/11/2015    BILITOT 0.2 (L) 02/11/2015       RADIOLOGY:  Imaging Results (Last 72 Hours)     ** No results found for the last 72 hours. **          I reviewed the patient's new clinical results.     2/4/20 ECG: reviewed    Cancer Staging (if applicable)  Cancer Patient: __ yes _X_no __unknown; If yes, clinical stage T:__ N:__M:__, stage group or __N/A    Impression: Bilateral lateral recess stenosis L4-5, primarily symptomatic on the left     Plan: Minimal access decompressive laminectomy L4-L5 bilaterally from a left-sided approach      Sara Zelaya, APRN   2/12/2020   11:18 AM

## 2020-02-12 NOTE — ANESTHESIA POSTPROCEDURE EVALUATION
Patient: Kaye Jaime    Procedure Summary     Date:  02/12/20 Room / Location:   [facility]  [facility]    Anesthesia Start:  1232 Anesthesia Stop:  1435    Procedure:  Minimal access laminectomy L4-5 LEFT (Left Spine Lumbar) Diagnosis:       Stenosis of lateral recess of lumbar spine      Herniation of intervertebral disc of lumbar spine      (Stenosis of lateral recess of lumbar spine [M48.061])      (Herniation of intervertebral disc of lumbar spine [M51.26])    Surgeon:  Steven Andrade MD Provider:  Alberto Hope MD    Anesthesia Type:  general ASA Status:  3          Anesthesia Type: general    Vitals  Vitals Value Taken Time   /86 2/12/2020  2:34 PM   Temp 97.3 °F (36.3 °C) 2/12/2020  2:34 PM   Pulse 112 2/12/2020  2:34 PM   Resp 18 2/12/2020  2:34 PM   SpO2 98 % 2/12/2020  2:34 PM           Post Anesthesia Care and Evaluation    Patient location during evaluation: PACU  Patient participation: complete - patient participated  Level of consciousness: awake and alert  Pain score: 0  Pain management: adequate  Airway patency: patent  Anesthetic complications: No anesthetic complications  PONV Status: none  Cardiovascular status: hemodynamically stable and acceptable  Respiratory status: nonlabored ventilation, acceptable and nasal cannula  Hydration status: acceptable

## 2020-02-12 NOTE — ANESTHESIA PREPROCEDURE EVALUATION
Anesthesia Evaluation                  Airway   Mallampati: II  Dental      Pulmonary    Cardiovascular         Neuro/Psych  (+) headaches,     GI/Hepatic/Renal/Endo    (+)  GERD,      Musculoskeletal     Abdominal    Substance History      OB/GYN          Other                        Anesthesia Plan    ASA 3     general     intravenous induction     Anesthetic plan, all risks, benefits, and alternatives have been provided, discussed and informed consent has been obtained with: patient.

## 2020-02-17 ENCOUNTER — TELEPHONE (OUTPATIENT)
Dept: NEUROSURGERY | Facility: CLINIC | Age: 65
End: 2020-02-17

## 2020-02-17 RX ORDER — ONDANSETRON 4 MG/1
4 TABLET, FILM COATED ORAL EVERY 8 HOURS PRN
Qty: 15 TABLET | Refills: 0 | Status: SHIPPED | OUTPATIENT
Start: 2020-02-17 | End: 2020-02-20 | Stop reason: SDUPTHER

## 2020-02-17 NOTE — TELEPHONE ENCOUNTER
Provider:  Raymond  Caller: Kaye  Time of call:   11:26a  Phone #:  [phone]  Surgery:  MINIMAL ACCESS LAMINECTOMY L4-5 LEFT  Surgery Date:  02/12/2020  Last visit: 12/5/19    Next visit: TBD      Reason for call:       Pt states the first few days after surgery she did ok. After she removed the patch from behind her ear is when the symptoms started.     She is very nauseated. She denies fever, chills, vomiting, diarrhea She can keep fluids down but not food.    She is not taking pain medication.

## 2020-02-20 DIAGNOSIS — M48.062 SPINAL STENOSIS, LUMBAR REGION, WITH NEUROGENIC CLAUDICATION: Primary | ICD-10-CM

## 2020-02-20 RX ORDER — ONDANSETRON 4 MG/1
4 TABLET, FILM COATED ORAL EVERY 8 HOURS PRN
Qty: 15 TABLET | Refills: 0 | Status: SHIPPED | OUTPATIENT
Start: 2020-02-20 | End: 2020-03-12 | Stop reason: SDUPTHER

## 2020-02-20 NOTE — TELEPHONE ENCOUNTER
Provider:  Raymond  Caller: patient  Time of call:  3:00   Phone #:  [phone]  Surgery:  Minimal access laminectomy L4-L5  Surgery Date:  02/12/2020  Last visit:   same  Next visit: 03/12/2020    AMRIK:         Reason for call:     Patient called and said she ran out of Zofran.  She still has some nausea, however she states she does not have any other symptoms.  Her pain is well controlled with Tylenol.    She didn't want to be without Zofran over the weekend if she was in need.

## 2020-03-12 ENCOUNTER — OFFICE VISIT (OUTPATIENT)
Dept: NEUROSURGERY | Facility: CLINIC | Age: 65
End: 2020-03-12

## 2020-03-12 VITALS
SYSTOLIC BLOOD PRESSURE: 110 MMHG | WEIGHT: 146 LBS | BODY MASS INDEX: 23.46 KG/M2 | DIASTOLIC BLOOD PRESSURE: 62 MMHG | TEMPERATURE: 98.9 F | HEIGHT: 66 IN

## 2020-03-12 DIAGNOSIS — M48.062 SPINAL STENOSIS, LUMBAR REGION, WITH NEUROGENIC CLAUDICATION: ICD-10-CM

## 2020-03-12 DIAGNOSIS — M48.061 STENOSIS OF LATERAL RECESS OF LUMBAR SPINE: Primary | ICD-10-CM

## 2020-03-12 PROCEDURE — 99024 POSTOP FOLLOW-UP VISIT: CPT | Performed by: PHYSICIAN ASSISTANT

## 2020-03-12 RX ORDER — ONDANSETRON 4 MG/1
8 TABLET, FILM COATED ORAL EVERY 8 HOURS PRN
Qty: 30 TABLET | Refills: 1 | Status: SHIPPED | OUTPATIENT
Start: 2020-03-12

## 2020-03-12 NOTE — PROGRESS NOTES
Patient: Kaye Jaime  : 1955  Gender: female    Primary Care Provider: Destiny Steen MD      Chief Complaint:   Chief Complaint   Patient presents with   • Post-op       History of Present Illness:  Kaye Jaime is a 64-year-old woman with a several month history of pain in her left hip and leg with associated walking intolerance.  MRI demonstrated significant lateral recess stenosis bilaterally L4-5 confirmed by myelogram.  As such she elected for minimal access decompression and presented for this procedure on 2020.    Patient presents today 4 weeks postop.  She reports improvement with left dorsiflexion weakness.  She continues to have some pain in her posterior hip region, left greater than right.  She denies new numbness or weakness in her lower extremities.  Lumbar incision is well-healed.  Postoperatively she has been severely nauseated requiring Zofran several times a day.  She has seen her PCP for this for with no definitive answer.    Past Medical and Surgical History:  Past Medical History:   Diagnosis Date   • Anxiety and depression    • Arthritis    • Disease of thyroid gland    • GERD (gastroesophageal reflux disease)    • Hiatal hernia    • PONV (postoperative nausea and vomiting)    • Spinal headache 2019    after myelogram    • Stress incontinence    • Wears eyeglasses      Past Surgical History:   Procedure Laterality Date   • CHOLECYSTECTOMY     • COLONOSCOPY     • ENDOSCOPY  2018   • HYSTERECTOMY      bso    • INTERVENTIONAL RADIOLOGY PROCEDURE N/A 2019    Procedure: myelogram lumbar spine;  Surgeon: Martinez Calvo MD;  Location:  [facility];  Service: Interventional Radiology   • LAPAROTOMY OOPHERECTOMY      age 19    • LUMBAR DISCECTOMY Left 2020    Procedure: MINIMAL ACCESS LAMINECTOMY L4-5 LEFT;  Surgeon: Steven Andrade MD;  Location:  [facility];  Service: Neurosurgery;  Laterality: Left;   • NISSEN FUNDOPLICATION     • SHOULDER  "SURGERY Right     with bicep distal repair        Current Medications:    Current Outpatient Medications:   •  amitriptyline (ELAVIL) 50 MG tablet, Take 50 mg by mouth Every Night., Disp: , Rfl:   •  busPIRone (BUSPAR) 15 MG tablet, Take 15 mg by mouth 2 (Two) Times a Day., Disp: , Rfl:   •  estradiol (ESTRACE) 1 MG tablet, Take 1 mg by mouth Daily., Disp: , Rfl:   •  famotidine (PEPCID) 20 MG tablet, Take 20 mg by mouth 2 (Two) Times a Day., Disp: , Rfl:   •  FLUoxetine (PROzac) 10 MG capsule, Take 15 mg by mouth Daily., Disp: , Rfl:   •  HYDROcodone-acetaminophen (NORCO) 5-325 MG per tablet, Take 1 tablet by mouth Every 6 (Six) Hours As Needed for Severe Pain ., Disp: 30 tablet, Rfl: 0  •  levothyroxine (SYNTHROID, LEVOTHROID) 112 MCG tablet, Take 112 mcg by mouth Daily., Disp: , Rfl:   •  LORazepam (ATIVAN) 1 MG tablet, Take 1 mg by mouth Every 8 (Eight) Hours As Needed for Anxiety., Disp: , Rfl:   •  ondansetron (ZOFRAN) 4 MG tablet, Take 2 tablets by mouth Every 8 (Eight) Hours As Needed for Nausea or Vomiting., Disp: 30 tablet, Rfl: 1    Allergies:  Allergies   Allergen Reactions   • Dilaudid [Hydromorphone Hcl] Shortness Of Breath   • Codeine Nausea Only         Review of Systems   All other systems reviewed and are negative.        Physical Exam  AXOX3  Strength and range of motion intact in lower extremities  Lumbar incision is well-healed    Vitals:    03/12/20 1335   BP: 110/62   Temp: 98.9 °F (37.2 °C)   Weight: 66.2 kg (146 lb)   Height: 167.6 cm (66\")             Assessment:  Bilateral lateral recess stenosis L4-5  Status post minimal access laminectomy L4-5    Plan:  Ms. Jaime is seen today in a postoperative visit following a minimal access laminectomy L4-5 4 weeks ago.  She is doing well at this time despite persistent nausea postoperatively.  I have refilled her Zofran.  I recommended that she continue following with her PCP regarding this.  She will return in 6 weeks for her next postoperative " visit.  If symptoms of hip pain persist in the interim, we will consider lumbar and hip x-rays prior to next visit.  She will contact our office with any concerns or worsening symptoms prior to next visit.        Fany Gamez PA-C

## 2020-04-03 ENCOUNTER — TRANSCRIBE ORDERS (OUTPATIENT)
Dept: NUTRITION | Facility: HOSPITAL | Age: 65
End: 2020-04-03

## 2020-04-03 DIAGNOSIS — R11.0 NAUSEA: Primary | ICD-10-CM

## 2020-04-09 ENCOUNTER — HOSPITAL ENCOUNTER (OUTPATIENT)
Dept: NUTRITION | Facility: HOSPITAL | Age: 65
Setting detail: RECURRING SERIES
Discharge: HOME | End: 2020-04-09

## 2020-04-09 VITALS — HEIGHT: 66 IN | WEIGHT: 137.35 LBS | BODY MASS INDEX: 22.07 KG/M2

## 2020-04-09 PROCEDURE — 97802 MEDICAL NUTRITION INDIV IN: CPT | Performed by: DIETITIAN, REGISTERED

## 2020-04-15 ENCOUNTER — HOSPITAL ENCOUNTER (OUTPATIENT)
Dept: NUTRITION | Facility: HOSPITAL | Age: 65
Setting detail: RECURRING SERIES
Discharge: HOME | End: 2020-04-15

## 2020-05-15 ENCOUNTER — HOSPITAL ENCOUNTER (OUTPATIENT)
Dept: NUTRITION | Facility: HOSPITAL | Age: 65
Setting detail: RECURRING SERIES
Discharge: HOME | End: 2020-05-15

## 2020-05-15 PROCEDURE — 97802 MEDICAL NUTRITION INDIV IN: CPT | Performed by: DIETITIAN, REGISTERED

## 2020-08-12 ENCOUNTER — OFFICE VISIT (OUTPATIENT)
Dept: NEUROSURGERY | Facility: CLINIC | Age: 65
End: 2020-08-12

## 2020-08-12 VITALS — WEIGHT: 147 LBS | HEIGHT: 65 IN | BODY MASS INDEX: 24.49 KG/M2

## 2020-08-12 DIAGNOSIS — M48.061 STENOSIS OF LATERAL RECESS OF LUMBAR SPINE: Primary | ICD-10-CM

## 2020-08-12 PROCEDURE — 99213 OFFICE O/P EST LOW 20 MIN: CPT | Performed by: PHYSICIAN ASSISTANT

## 2020-08-12 RX ORDER — PANTOPRAZOLE SODIUM 20 MG/1
TABLET, DELAYED RELEASE ORAL
COMMUNITY

## 2020-08-12 NOTE — PROGRESS NOTES
Patient: Kaye Jaime  : 1955  Gender: female    Primary Care Provider: Destiny Steen MD    Requesting Provider: As above    Chief Complaint:   Chief Complaint   Patient presents with   • Follow-up       History of Present Illness:  Kaye Jaime is a 64-year-old woman who presents today for a follow-up visit.  She underwent a laminectomy at L4-5 for bilateral lateral recess stenosis with Dr. Andrade on 2020.  Postoperatively she has had significant improvement with left foot dorsiflexion weakness.  She was plagued with severe nausea postoperatively which has since resolved.  She is pleased with her surgical outcome.    Ms. Jaime reports pain in her low back and left hip region that has been present postoperatively.  She states that the majority of her symptoms have improved, however this persists.  She denies new weakness or numbness in her lower extremities.  No bowel or bladder dysfunction.  She is getting around pretty well.  She has no other complaints at this time.      Past Medical and Surgical History:  Past Medical History:   Diagnosis Date   • Anxiety and depression    • Arthritis    • Disease of thyroid gland    • GERD (gastroesophageal reflux disease)    • Hiatal hernia    • PONV (postoperative nausea and vomiting)    • Spinal headache 2019    after myelogram    • Stress incontinence    • Wears eyeglasses      Past Surgical History:   Procedure Laterality Date   • CHOLECYSTECTOMY     • COLONOSCOPY     • ENDOSCOPY  2018   • HYSTERECTOMY      bso    • INTERVENTIONAL RADIOLOGY PROCEDURE N/A 2019    Procedure: myelogram lumbar spine;  Surgeon: Martinez Calvo MD;  Location:  [facility];  Service: Interventional Radiology   • LAPAROTOMY OOPHERECTOMY      age 19    • LUMBAR DISCECTOMY Left 2020    Procedure: MINIMAL ACCESS LAMINECTOMY L4-5 LEFT;  Surgeon: Steven Andrade MD;  Location:  [facility];  Service: Neurosurgery;  Laterality: Left;   • NISSEN  "FUNDOPLICATION     • SHOULDER SURGERY Right     with bicep distal repair        Current Medications:    Current Outpatient Medications:   •  amitriptyline (ELAVIL) 50 MG tablet, Take 50 mg by mouth Every Night., Disp: , Rfl:   •  busPIRone (BUSPAR) 15 MG tablet, Take 15 mg by mouth 2 (Two) Times a Day., Disp: , Rfl:   •  estradiol (ESTRACE) 1 MG tablet, Take 1 mg by mouth Daily., Disp: , Rfl:   •  famotidine (PEPCID) 20 MG tablet, Take 20 mg by mouth 2 (Two) Times a Day., Disp: , Rfl:   •  FLUoxetine (PROzac) 10 MG capsule, Take 15 mg by mouth Daily., Disp: , Rfl:   •  levothyroxine (SYNTHROID, LEVOTHROID) 112 MCG tablet, Take 112 mcg by mouth Daily., Disp: , Rfl:   •  LORazepam (ATIVAN) 1 MG tablet, Take 1 mg by mouth Every 8 (Eight) Hours As Needed for Anxiety., Disp: , Rfl:   •  HYDROcodone-acetaminophen (NORCO) 5-325 MG per tablet, Take 1 tablet by mouth Every 6 (Six) Hours As Needed for Severe Pain ., Disp: 30 tablet, Rfl: 0  •  ondansetron (ZOFRAN) 4 MG tablet, Take 2 tablets by mouth Every 8 (Eight) Hours As Needed for Nausea or Vomiting., Disp: 30 tablet, Rfl: 1  •  pantoprazole (Protonix) 20 MG EC tablet, Protonix 20 mg tablet,delayed release  Take 1 tablet every day by oral route., Disp: , Rfl:     Allergies:  Allergies   Allergen Reactions   • Dilaudid [Hydromorphone Hcl] Shortness Of Breath   • Codeine Nausea Only         Review of Systems   Musculoskeletal: Positive for back pain.         Physical Exam  Physical examination limited secondary to COVID-19 environment  Patient was alert, oriented and cooperative during our telephone encounter  She appeared to be in no acute distress    Vitals:    08/12/20 1444   Weight: 66.7 kg (147 lb)   Height: 165.1 cm (65\")       Patient's Body mass index is 24.46 kg/m². BMI is within normal parameters. No follow-up required..      Assessment:  1.  Lateral recess stenosis L4-5  2.  S/p laminectomy L4-5 on 2/12/2020    Plan:  Ms. Jaime is seen today via telephone visit. "  She is 6 months out from an L4-5 laminectomy.  She has had significant improvement with left dorsiflexion weakness, however she continues to have some low-grade back and left hip pain.  She has inquired about injections.  I have referred her to Dr. Srivastava for consideration of this.  She wishes to hold off on additional imaging at this time.  We will see her back following injections if needed.  She was instructed to contact us with any concerns or worsening symptoms moving forward.        Fany Gamez PA-C

## 2020-08-19 ENCOUNTER — TELEPHONE (OUTPATIENT)
Dept: PAIN MEDICINE | Facility: CLINIC | Age: 65
End: 2020-08-19

## 2020-09-12 PROBLEM — Z98.890 HISTORY OF LUMBAR SURGERY: Status: ACTIVE | Noted: 2020-09-12

## 2020-09-12 PROBLEM — F32.A ANXIETY AND DEPRESSION: Status: ACTIVE | Noted: 2020-09-12

## 2020-09-12 PROBLEM — F41.9 ANXIETY AND DEPRESSION: Status: ACTIVE | Noted: 2020-09-12

## 2020-09-12 PROBLEM — M96.1 LUMBAR POSTLAMINECTOMY SYNDROME: Status: ACTIVE | Noted: 2020-09-12

## 2020-09-12 PROBLEM — M48.062 LUMBAR STENOSIS WITH NEUROGENIC CLAUDICATION: Status: ACTIVE | Noted: 2020-09-12

## 2021-05-03 ENCOUNTER — TELEPHONE (OUTPATIENT)
Dept: NEUROSURGERY | Facility: CLINIC | Age: 66
End: 2021-05-03

## 2021-05-03 NOTE — TELEPHONE ENCOUNTER
Caller: Kaye Jaime    Relationship: Self    Best call back number: [phone]    What orders are you requesting (i.e. lab or imaging):REFERRAL TO PAIN MANAGEMENT    In what timeframe would the patient need to come in:ASAP    Where will you receive your lab/imaging services:  [facility]    Additional notes:PT CALLED AND STATED SHE HAD A REFERRAL OVER A YEAR AGO TO SEE  FOR INJECTIONS. PT STATED BECAUSE OF COVID SHE DID NOT WANT TO GO. PT WAS UNSURE IF SHE HAD TO MAKE A NEW APPT. TO SEE DARREN GREY OR IF SHE COULD JUST RE-SEND THE REFERRAL. PLEASE [facility] THANK YOU!

## 2021-05-03 NOTE — TELEPHONE ENCOUNTER
Provider:  Fany SOTO  Caller: Patient  Time of call:   12:21  Phone #:  [phone]  Surgery:  Minimal access laminectomy  Surgery Date:  02/12/20  Last visit:   08/12/20  Next visit:     AMRIK:         Reason for call:    Please see Eloina's note.

## 2022-03-24 ENCOUNTER — TELEPHONE (OUTPATIENT)
Dept: NEUROSURGERY | Facility: CLINIC | Age: 67
End: 2022-03-24

## 2022-03-24 NOTE — TELEPHONE ENCOUNTER
Caller: Kaye Jaime    Relationship to patient: Self    Best call back number: [phone]    Patient is needing: SINCE PATIENT LIVES IN [place], [place]  SHE WOULD LIKE TO KNOW IF THERE IS A PM FACILITY SHE CAN SEE ON HER SIDE OF [facility] OR CLOSER.  PLEASE CALL TO [facility] .  [facility]?

## 2022-03-29 NOTE — TELEPHONE ENCOUNTER
I s/w patient and notified her that PM referral was faxed to [facility] per her request. She was thankful for the call back.

## 2022-03-31 NOTE — TELEPHONE ENCOUNTER
Caller: Kaye Jaime  Relationship: Self  Best call back number: [phone]    What was the call regarding: PATIENT CALLED BACK AS SHE SPOKE WITH [facility] AND THEY STATED THEY DO NOT HAVE THE REFERRAL. SHE WANTED TO KNOW WHAT FAX NUMBER WE SENT IT TO, I LET HER KNOW THAT THE FAX NUMBER WAS NOT LISTED BUT IT STATES THE REFERRAL WAS SENT TO [facility].     SHE HAS ASKED THAT THE REFERRAL BE RE-FAXED -[phone]

## (undated) DEVICE — DIFFUSER: Brand: CORE, MAESTRO

## (undated) DEVICE — GLV SURG PREMIERPRO MIC LTX PF SZ6.5 BRN

## (undated) DEVICE — HOLDER: Brand: DEROYAL

## (undated) DEVICE — INSTRUMENT 9560702 RADIANCE ILLUMIN SYS: Brand: METRX® SYSTEM

## (undated) DEVICE — GLV SURG PREMIERPRO MIC LTX PF SZ6 BRN

## (undated) DEVICE — SKIN AFFIX SURG ADHESIVE 72/CS 0.55ML: Brand: MEDLINE

## (undated) DEVICE — HDRST INTUB GENTLETOUCH SLOT 7IN RT

## (undated) DEVICE — ANTIBACTERIAL UNDYED BRAIDED (POLYGLACTIN 910), SYNTHETIC ABSORBABLE SUTURE: Brand: COATED VICRYL

## (undated) DEVICE — 3.0MM PRECISION NEURO (MATCH HEAD)

## (undated) DEVICE — GLV SURG PREMIERPRO MIC LTX PF SZ8.5 BRN

## (undated) DEVICE — OIL CARTRIDGE: Brand: CORE, MAESTRO

## (undated) DEVICE — 3M™ WARMING BLANKET, UPPER BODY, 10 PER CASE, 42268: Brand: BAIR HUGGER™

## (undated) DEVICE — GLV SURG PREMIERPRO MIC LTX PF SZ8 BRN

## (undated) DEVICE — DRSNG WND BORDR/ADHS NONADHR/GZ LF 4X4IN STRL

## (undated) DEVICE — CVR HNDL LT SURG ACCSSRY BLU STRL

## (undated) DEVICE — SUT VIC 0 UR6 27IN VCP603H

## (undated) DEVICE — PK MED 10